# Patient Record
(demographics unavailable — no encounter records)

---

## 2025-04-15 NOTE — ASSESSMENT
[FreeTextEntry1] : 1. Possible underlying PCOS, but would need to exclude other endocrinopathies, including NC-CAH and Cushing syndrome.  - will check am ASTD, DHEAS, testosterone, estradiol, gonadotropins, prolactin, IGF-1, insulin/glucose levels and 17-OHP/pregnenolone  - 1 mg ONDST If suspicious for NC-CAH, will proceed with ACTH stimulation test +/- genetic testing. Genetic implications of NC-CAH reviewed with the patient.  If confirmed insulin resistance, will consider trial of Metformin. Use of OCP's reviewed.  2. Morbid obesity - Current approaches to weight management are discussed with the patient.  Suggested extensive nutritional education program. Proper dietary restrictions and exercise routines discussed. Medical weight loss therapies were reviewed with the patient. She'd be a good candidate for GLP1RA therapy, but we might have difficulties have them covered by her insurance. We'll consider Phentermine/ Qsymia, but would need to be cautious in view of her medication.  Bariatric options discussed. Refer for a sleep study.  RTC 2-3 weeks (TEB)

## 2025-04-15 NOTE — REASON FOR VISIT
[Consultation] : a consultation visit [Weight Management/Obesity] : weight management/obesity [PCOS] : PCOS

## 2025-04-15 NOTE — HISTORY OF PRESENT ILLNESS
[FreeTextEntry1] : 27 year female  referred for hormonal evaluation.  Ms. KIM  reports a history of abnormal testosterone levels , when checked by her PCP. She's been complaining of irregular cycles since menarche ( at the age of 12 yo). Her cycles are typically once every 2-3 months, lasting 4-7 days. LMP  2 weeks ago. She had a pelvic US done a few years ago, that found "some cysts on ovaries"  Never on OCP's. Never been sexually active. Has not seen a gynecologist yet.  She denies facial acne, but does have some acne on her back and shoulders.  She denies an increased facial/body hair growth  or scalp hair loss, breast discharge, shoe/ring size change, easy bruising or new purple stretch marks on the abdomen/thighs. She deneis any history of kidney stones or gallstones, pancreatitis.  She gained about  90 lbs over the past 10 years. She has failed several weight loss programs, but has not tried any weight loss medications. Her sleep is interrupted, not restful . She snores. 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  She denies family history of thyroid cancer or history of radiation exposure to head and neck area in a childhood. Her mother passed way from a metastatic breast cancer. There are no labs available for review.

## 2025-04-15 NOTE — REVIEW OF SYSTEMS
[TextEntry] : Constitutional: Denies excessive fatigue.  weight changes as above Skin: Denies diaphoresis or sweating  HEENT: Denies vision changes, eye protrusion/dryness Endocrine: No cold intolerance. No heat intolerance  CV: Denies chest pain, palpitations or sensation of heart racing  Pulmonary: No SOB GI: Denies diarrhea or constipation, nausea/ vomiting  : as above Neurological: Denies numbness, paresthesias, tremor  Neuropsychiatric: Denies anxiety , depression Musculoskeletal: Denies new joint pain, muscle aches or proximal muscle weakness

## 2025-05-06 NOTE — HISTORY OF PRESENT ILLNESS
[Home] : at home, [unfilled] , at the time of the visit. [Medical Office: ([facility])___] : at the medical office located in  [Telehealth (audio & video)] : This visit was provided via telehealth using real-time 2-way audio visual technology. [Verbal consent obtained from patient] : the patient, [unfilled] [FreeTextEntry1] : 27 year female  f/u for hormonal evaluation.   *** Televisit  May 06, 2025 ***  patient is here for results review 1mg ONDST - 0.5 a1c- 6.2% fasting insulin- 49 DHEAS- 592, testo- 58, free T- 4.2 FSH/LH- 4.3/6.3  HPI: Ms. KIM  reports a history of abnormal testosterone levels , when checked by her PCP. She's been complaining of irregular cycles since menarche ( at the age of 12 yo). Her cycles are typically once every 2-3 months, lasting 4-7 days. LMP  2 weeks ago. She had a pelvic US done a few years ago, that found "some cysts on ovaries"  Never on OCP's. Never been sexually active. Has not seen a gynecologist yet.  She denies facial acne, but does have some acne on her back and shoulders.  She denies an increased facial/body hair growth  or scalp hair loss, breast discharge, shoe/ring size change, easy bruising or new purple stretch marks on the abdomen/thighs. She deneis any history of kidney stones or gallstones, pancreatitis.  She gained about  90 lbs over the past 10 years. She has failed several weight loss programs, but has not tried any weight loss medications. Her sleep is interrupted, not restful . She snores. 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  She denies family history of thyroid cancer or history of radiation exposure to head and neck area in a childhood. Her mother passed way from a metastatic breast cancer. There are no labs available for review.

## 2025-05-06 NOTE — ASSESSMENT
[FreeTextEntry1] : 1. Possible underlying PCOS. Insulin resistance - neg screening for CS/ NC-CAH - formal 2h OGTT - trial of Metformin discussed. Use of OCP's reviewed.  2. Morbid obesity - Current approaches to weight management are discussed with the patient.  Suggested extensive nutritional education program. Proper dietary restrictions and exercise routines discussed. Medical weight loss therapies were reviewed with the patient. Again advised on GLP1RA therapy, but we might have difficulties have them covered by her insurance. We'll consider Phentermine/ Qsymia, but would need to be cautious in view of her medication.  Bariatric options discussed. - sleep study is scheduled.  RTC 2post labs